# Patient Record
Sex: FEMALE | Race: WHITE | NOT HISPANIC OR LATINO | Employment: OTHER | URBAN - METROPOLITAN AREA
[De-identification: names, ages, dates, MRNs, and addresses within clinical notes are randomized per-mention and may not be internally consistent; named-entity substitution may affect disease eponyms.]

---

## 2017-01-12 ENCOUNTER — HOSPITAL ENCOUNTER (OUTPATIENT)
Dept: INFUSION CENTER | Facility: CLINIC | Age: 72
Discharge: HOME | End: 2017-01-12
Payer: MEDICARE

## 2017-01-12 VITALS
RESPIRATION RATE: 18 BRPM | DIASTOLIC BLOOD PRESSURE: 84 MMHG | TEMPERATURE: 96.7 F | HEART RATE: 71 BPM | SYSTOLIC BLOOD PRESSURE: 132 MMHG

## 2017-01-12 PROCEDURE — 96372 THER/PROPH/DIAG INJ SC/IM: CPT

## 2017-01-12 RX ADMIN — OMALIZUMAB 150 MG: 202.5 INJECTION, SOLUTION SUBCUTANEOUS at 13:52

## 2017-02-09 ENCOUNTER — HOSPITAL ENCOUNTER (OUTPATIENT)
Dept: INFUSION CENTER | Facility: CLINIC | Age: 72
Discharge: HOME | End: 2017-02-09
Payer: MEDICARE

## 2017-02-09 VITALS
DIASTOLIC BLOOD PRESSURE: 84 MMHG | RESPIRATION RATE: 18 BRPM | SYSTOLIC BLOOD PRESSURE: 128 MMHG | TEMPERATURE: 96.4 F | HEART RATE: 72 BPM

## 2017-02-09 PROCEDURE — 96372 THER/PROPH/DIAG INJ SC/IM: CPT

## 2017-02-09 RX ORDER — BIOTIN 10000 MCG
10 CAPSULE ORAL DAILY
COMMUNITY
End: 2018-03-19 | Stop reason: ALTCHOICE

## 2017-02-09 RX ADMIN — OMALIZUMAB 150 MG: 202.5 INJECTION, SOLUTION SUBCUTANEOUS at 09:23

## 2017-02-09 NOTE — PROGRESS NOTES
Pt arrived to Infusion center for Xolair injection, no complaints noted  Pt has Epi pen (exp  11/2017)  Injections given and waited the 30 min observation time  Pt  tolerated well, no reactions noted  AVS given and next appointment made, pt aware

## 2017-03-09 ENCOUNTER — HOSPITAL ENCOUNTER (OUTPATIENT)
Dept: INFUSION CENTER | Facility: CLINIC | Age: 72
Discharge: HOME | End: 2017-03-09
Payer: MEDICARE

## 2017-03-09 VITALS
TEMPERATURE: 96.9 F | HEART RATE: 76 BPM | RESPIRATION RATE: 18 BRPM | SYSTOLIC BLOOD PRESSURE: 121 MMHG | DIASTOLIC BLOOD PRESSURE: 82 MMHG

## 2017-03-09 PROCEDURE — 96372 THER/PROPH/DIAG INJ SC/IM: CPT

## 2017-03-09 RX ADMIN — OMALIZUMAB 150 MG: 202.5 INJECTION, SOLUTION SUBCUTANEOUS at 09:14

## 2017-03-09 NOTE — PROGRESS NOTES
Patient arrived to the infusion center with her epi pen  Xolair given SQ in right and left arms  Pt observed for 30 minutes with no adverse events  Pt is aware of next appointment

## 2017-04-06 ENCOUNTER — HOSPITAL ENCOUNTER (OUTPATIENT)
Dept: INFUSION CENTER | Facility: CLINIC | Age: 72
Discharge: HOME | End: 2017-04-06
Payer: MEDICARE

## 2017-04-06 VITALS
TEMPERATURE: 96.1 F | HEART RATE: 72 BPM | DIASTOLIC BLOOD PRESSURE: 84 MMHG | SYSTOLIC BLOOD PRESSURE: 140 MMHG | RESPIRATION RATE: 18 BRPM

## 2017-04-06 PROCEDURE — 96372 THER/PROPH/DIAG INJ SC/IM: CPT

## 2017-04-06 RX ADMIN — OMALIZUMAB 150 MG: 202.5 INJECTION, SOLUTION SUBCUTANEOUS at 10:04

## 2017-04-06 NOTE — PROGRESS NOTES
Tolerated xolair injections well, no s/s of rxn noted  Will return for next appt as scheduled  Epi-pen on her person

## 2017-05-03 ENCOUNTER — HOSPITAL ENCOUNTER (OUTPATIENT)
Dept: INFUSION CENTER | Facility: CLINIC | Age: 72
Discharge: HOME | End: 2017-05-03
Payer: MEDICARE

## 2017-05-03 VITALS
TEMPERATURE: 97 F | RESPIRATION RATE: 16 BRPM | SYSTOLIC BLOOD PRESSURE: 132 MMHG | DIASTOLIC BLOOD PRESSURE: 88 MMHG | HEART RATE: 64 BPM

## 2017-05-03 PROCEDURE — 96372 THER/PROPH/DIAG INJ SC/IM: CPT

## 2017-05-03 RX ADMIN — OMALIZUMAB 150 MG: 202.5 INJECTION, SOLUTION SUBCUTANEOUS at 11:54

## 2017-05-03 NOTE — PLAN OF CARE
Problem: PAIN - ADULT  Goal: Verbalizes/displays adequate comfort level or baseline comfort level  Interventions:  - Encourage patient to monitor pain and request assistance  - Assess pain using appropriate pain scale  - Administer analgesics based on type and severity of pain and evaluate response  - Implement non-pharmacological measures as appropriate and evaluate response  - Consider cultural and social influences on pain and pain management  - Notify physician/advanced practitioner if interventions unsuccessful or patient reports new pain  Outcome: Progressing    Problem: INFECTION - ADULT  Goal: Absence or prevention of progression during hospitalization  INTERVENTIONS:  - Assess and monitor for signs and symptoms of infection  - Monitor lab/diagnostic results  - Monitor all insertion sites, i e  indwelling lines, tubes, and drains  - Monitor endotracheal (as able) and nasal secretions for changes in amount and color  - [place] appropriate cooling/warming therapies per order  - Administer medications as ordered  - Instruct and encourage patient and family to use good hand hygiene technique  - Identify and instruct in appropriate isolation precautions for identified infection/condition  Outcome: Progressing  Goal: Absence of fever/infection during neutropenic period  INTERVENTIONS:  - Monitor WBC  - Implement neutropenic guidelines  Outcome: Progressing    Problem: Knowledge Deficit  Goal: Patient/family/caregiver demonstrates understanding of disease process, treatment plan, medications, and discharge instructions  Complete learning assessment and assess knowledge base    Interventions:  - Provide teaching at level of understanding  - Provide teaching via preferred learning methods  Outcome: Progressing

## 2017-05-03 NOTE — PROGRESS NOTES
Pt  Denies new symptoms or concerns at this time  Xolair ordered today  Epi Pen present; expiration date-11/2017

## 2017-05-03 NOTE — PROGRESS NOTES
Xolair given SQ in Bilateral upper arms, 150mg in each arm to equal ordered dose of 300mg   Will monitor x 30 min

## 2017-05-31 ENCOUNTER — HOSPITAL ENCOUNTER (OUTPATIENT)
Dept: INFUSION CENTER | Facility: CLINIC | Age: 72
Discharge: HOME | End: 2017-05-31
Payer: MEDICARE

## 2017-06-09 ENCOUNTER — HOSPITAL ENCOUNTER (OUTPATIENT)
Dept: INFUSION CENTER | Facility: CLINIC | Age: 72
Discharge: HOME | End: 2017-06-09
Payer: MEDICARE

## 2017-06-09 VITALS — TEMPERATURE: 96.8 F

## 2017-06-09 PROCEDURE — 96372 THER/PROPH/DIAG INJ SC/IM: CPT

## 2017-06-09 RX ORDER — OMEPRAZOLE 40 MG/1
40 CAPSULE, DELAYED RELEASE ORAL DAILY
COMMUNITY
End: 2021-08-16

## 2017-06-09 RX ORDER — RANITIDINE HCL 75 MG
300 TABLET ORAL
COMMUNITY
End: 2019-05-20

## 2017-06-09 RX ADMIN — OMALIZUMAB 150 MG: 202.5 INJECTION, SOLUTION SUBCUTANEOUS at 09:24

## 2017-06-09 NOTE — PROGRESS NOTES
Pt  Tolerated Xolair injections, one in each arm, SQ 150mg each  Pt  Was observed for 30 min and reports no symptoms and left ambulating

## 2017-07-21 ENCOUNTER — HOSPITAL ENCOUNTER (OUTPATIENT)
Dept: INFUSION CENTER | Facility: CLINIC | Age: 72
Discharge: HOME | End: 2017-07-21
Payer: MEDICARE

## 2017-07-21 PROCEDURE — 96372 THER/PROPH/DIAG INJ SC/IM: CPT

## 2017-07-21 RX ADMIN — OMALIZUMAB 150 MG: 202.5 INJECTION, SOLUTION SUBCUTANEOUS at 12:22

## 2017-07-21 RX ADMIN — OMALIZUMAB 150 MG: 202.5 INJECTION, SOLUTION SUBCUTANEOUS at 12:21

## 2017-07-21 NOTE — PROGRESS NOTES
Patient arrived to the infusion center with her epi pen  Xolair given as ordered  Pt observed for 30 minutes post injections with no adverse events  Pt is aware of next appointment  Pt left the infusion center in stable condition

## 2017-08-18 ENCOUNTER — HOSPITAL ENCOUNTER (OUTPATIENT)
Dept: INFUSION CENTER | Facility: CLINIC | Age: 72
Discharge: HOME | End: 2017-08-18
Payer: MEDICARE

## 2017-08-18 VITALS
HEART RATE: 64 BPM | TEMPERATURE: 96.1 F | RESPIRATION RATE: 18 BRPM | SYSTOLIC BLOOD PRESSURE: 144 MMHG | DIASTOLIC BLOOD PRESSURE: 83 MMHG

## 2017-08-18 PROCEDURE — 96372 THER/PROPH/DIAG INJ SC/IM: CPT

## 2017-08-18 RX ADMIN — OMALIZUMAB 150 MG: 202.5 INJECTION, SOLUTION SUBCUTANEOUS at 11:27

## 2017-08-18 RX ADMIN — OMALIZUMAB 150 MG: 202.5 INJECTION, SOLUTION SUBCUTANEOUS at 11:26

## 2017-08-18 NOTE — PLAN OF CARE

## 2017-09-25 ENCOUNTER — HOSPITAL ENCOUNTER (OUTPATIENT)
Dept: INFUSION CENTER | Facility: CLINIC | Age: 72
Discharge: HOME | End: 2017-09-25
Payer: MEDICARE

## 2017-10-05 ENCOUNTER — HOSPITAL ENCOUNTER (OUTPATIENT)
Dept: INFUSION CENTER | Facility: CLINIC | Age: 72
Discharge: HOME | End: 2017-10-05
Payer: MEDICARE

## 2017-10-05 VITALS
DIASTOLIC BLOOD PRESSURE: 79 MMHG | RESPIRATION RATE: 18 BRPM | SYSTOLIC BLOOD PRESSURE: 143 MMHG | HEART RATE: 70 BPM | TEMPERATURE: 98.9 F

## 2017-10-05 PROCEDURE — 96372 THER/PROPH/DIAG INJ SC/IM: CPT

## 2017-10-05 RX ADMIN — OMALIZUMAB 150 MG: 202.5 INJECTION, SOLUTION SUBCUTANEOUS at 09:23

## 2017-10-05 RX ADMIN — OMALIZUMAB 150 MG: 202.5 INJECTION, SOLUTION SUBCUTANEOUS at 09:24

## 2017-10-05 NOTE — PROGRESS NOTES
Pt observed for 30 minutes post xolair  Pt left infusion center in stable condition  Pt declined AVS but is aware of future appts

## 2017-11-06 ENCOUNTER — HOSPITAL ENCOUNTER (OUTPATIENT)
Dept: INFUSION CENTER | Facility: CLINIC | Age: 72
Discharge: HOME | End: 2017-11-06
Payer: MEDICARE

## 2017-11-06 VITALS
RESPIRATION RATE: 18 BRPM | DIASTOLIC BLOOD PRESSURE: 78 MMHG | TEMPERATURE: 96.4 F | SYSTOLIC BLOOD PRESSURE: 137 MMHG | HEART RATE: 64 BPM

## 2017-11-06 PROCEDURE — 96372 THER/PROPH/DIAG INJ SC/IM: CPT

## 2017-11-06 RX ORDER — MULTIVITAMIN WITH IRON
1 TABLET ORAL DAILY
COMMUNITY
End: 2021-05-13

## 2017-11-06 RX ADMIN — OMALIZUMAB 150 MG: 202.5 INJECTION, SOLUTION SUBCUTANEOUS at 08:34

## 2017-11-06 NOTE — PROGRESS NOTES
Patient arrived to the infusion center with her epi pen  Denies any discomfort  Xolair given as ordered  Pt observed for 30 minutes with no adverse events  Pt is aware of next appointment   Refused AVS

## 2017-12-04 ENCOUNTER — HOSPITAL ENCOUNTER (OUTPATIENT)
Dept: INFUSION CENTER | Facility: CLINIC | Age: 72
Discharge: HOME | End: 2017-12-04
Payer: MEDICARE

## 2017-12-04 VITALS
HEART RATE: 71 BPM | TEMPERATURE: 96 F | RESPIRATION RATE: 18 BRPM | SYSTOLIC BLOOD PRESSURE: 122 MMHG | DIASTOLIC BLOOD PRESSURE: 79 MMHG

## 2017-12-04 PROCEDURE — 96372 THER/PROPH/DIAG INJ SC/IM: CPT

## 2017-12-04 RX ADMIN — OMALIZUMAB 150 MG: 202.5 INJECTION, SOLUTION SUBCUTANEOUS at 08:58

## 2017-12-04 NOTE — PROGRESS NOTES
Pt arrived to infusion center for xolair injection  Epipen expires 12/2018  Pt offers no complaints  [place] for treatment today

## 2017-12-04 NOTE — PROGRESS NOTES
Pt received xolair injection in both arms without complications  Observed pt for 30 minutes post-administration without incident  Pt discharged in stable condition  Next appointment scheduled in 4 weeks  AVS provided

## 2018-01-02 ENCOUNTER — HOSPITAL ENCOUNTER (OUTPATIENT)
Dept: INFUSION CENTER | Facility: CLINIC | Age: 73
Discharge: HOME | End: 2018-01-04
Payer: MEDICARE

## 2018-01-31 ENCOUNTER — HOSPITAL ENCOUNTER (OUTPATIENT)
Dept: INFUSION CENTER | Facility: CLINIC | Age: 73
Discharge: HOME | End: 2018-01-31
Payer: MEDICARE

## 2018-01-31 VITALS
RESPIRATION RATE: 16 BRPM | DIASTOLIC BLOOD PRESSURE: 81 MMHG | HEART RATE: 79 BPM | TEMPERATURE: 97.6 F | SYSTOLIC BLOOD PRESSURE: 136 MMHG

## 2018-01-31 PROCEDURE — 96372 THER/PROPH/DIAG INJ SC/IM: CPT

## 2018-01-31 RX ORDER — ALBUTEROL SULFATE 90 UG/1
2 AEROSOL, METERED RESPIRATORY (INHALATION) EVERY 6 HOURS PRN
COMMUNITY
End: 2019-05-20 | Stop reason: SDUPTHER

## 2018-01-31 RX ADMIN — OMALIZUMAB 150 MG: 202.5 INJECTION, SOLUTION SUBCUTANEOUS at 08:24

## 2018-01-31 NOTE — PROGRESS NOTES
Patient arrived on unit  Stated she had been sick and on antibiotics and steroids due to URI, now completed  Feeling much better and cleared for Xolair today  Has epipen along with her and within  expiration date  Tolerated Xolair injections  Remained 30 minute for observation  No discomforts  Aware of next appointment   AVS given to patient

## 2018-03-19 ENCOUNTER — HOSPITAL ENCOUNTER (OUTPATIENT)
Dept: INFUSION CENTER | Facility: CLINIC | Age: 73
Discharge: HOME | End: 2018-03-19
Payer: MEDICARE

## 2018-03-19 VITALS
DIASTOLIC BLOOD PRESSURE: 70 MMHG | RESPIRATION RATE: 16 BRPM | TEMPERATURE: 96.5 F | SYSTOLIC BLOOD PRESSURE: 122 MMHG | HEART RATE: 76 BPM

## 2018-03-19 PROCEDURE — 96372 THER/PROPH/DIAG INJ SC/IM: CPT

## 2018-03-19 RX ADMIN — OMALIZUMAB 150 MG: 202.5 INJECTION, SOLUTION SUBCUTANEOUS at 09:19

## 2018-03-19 NOTE — PLAN OF CARE
Problem: Potential for Falls  Goal: Patient will remain free of falls  INTERVENTIONS:  - Assess patient frequently for physical needs  -  Identify cognitive and physical deficits and behaviors that affect risk of falls    -  [place] fall precautions as indicated by assessment   - Educate patient/family on patient safety including physical limitations  - Instruct patient to call for assistance with activity based on assessment  - Modify environment to reduce risk of injury  - Consider OT/PT consult to assist with strengthening/mobility   Outcome: Progressing

## 2018-03-19 NOTE — PROGRESS NOTES
Patient arrived to unit today without any complaints  Patient epipen expires Dec 2018  Patient tolerated xolair in right and left upper arm SQ without complicaitons  Patient waited for half hour per order and made next appt in 4 weeks  AVS declined

## 2018-04-16 ENCOUNTER — HOSPITAL ENCOUNTER (OUTPATIENT)
Dept: INFUSION CENTER | Facility: CLINIC | Age: 73
Discharge: HOME | End: 2018-04-16
Payer: MEDICARE

## 2018-04-16 VITALS
DIASTOLIC BLOOD PRESSURE: 89 MMHG | SYSTOLIC BLOOD PRESSURE: 133 MMHG | HEART RATE: 71 BPM | TEMPERATURE: 97.2 F | RESPIRATION RATE: 16 BRPM

## 2018-04-16 PROCEDURE — 96372 THER/PROPH/DIAG INJ SC/IM: CPT

## 2018-04-16 RX ADMIN — OMALIZUMAB 150 MG: 202.5 INJECTION, SOLUTION SUBCUTANEOUS at 09:41

## 2018-04-16 RX ADMIN — OMALIZUMAB 150 MG: 202.5 INJECTION, SOLUTION SUBCUTANEOUS at 09:42

## 2018-04-16 NOTE — PLAN OF CARE
Problem: Potential for Falls  Goal: Patient will remain free of falls  INTERVENTIONS:  - Assess patient frequently for physical needs  -  Identify cognitive and physical deficits and behaviors that affect risk of falls    -  [place] fall precautions as indicated by assessment   - Educate patient/family on patient safety including physical limitations  - Instruct patient to call for assistance with activity based on assessment  - Modify environment to reduce risk of injury  - Consider OT/PT consult to assist with strengthening/mobility   Outcome: Progressing

## 2018-04-16 NOTE — PROGRESS NOTES
Patient arrived to the infusion center with her epi pen  Pt offers no complaints  Xolair given as ordered  Pt observed for 30 minutes post injections with no complications  Pt is aware of next appointment   Refused AVS

## 2018-05-21 ENCOUNTER — HOSPITAL ENCOUNTER (OUTPATIENT)
Dept: INFUSION CENTER | Facility: CLINIC | Age: 73
Discharge: HOME | End: 2018-05-21
Payer: MEDICARE

## 2018-05-21 VITALS
RESPIRATION RATE: 18 BRPM | TEMPERATURE: 98.1 F | HEART RATE: 79 BPM | SYSTOLIC BLOOD PRESSURE: 116 MMHG | DIASTOLIC BLOOD PRESSURE: 72 MMHG

## 2018-05-21 PROCEDURE — 96372 THER/PROPH/DIAG INJ SC/IM: CPT

## 2018-05-21 RX ADMIN — OMALIZUMAB 150 MG: 202.5 INJECTION, SOLUTION SUBCUTANEOUS at 13:01

## 2018-05-21 NOTE — PROGRESS NOTES
Patient arrived to the infusion center with her epi pen  Pt offers no complaints  Xolair given as ordered  Pt observed for 30 minutes post injections with no adverse events  Pt is aware of next appointment   Refused AVS

## 2018-06-18 ENCOUNTER — HOSPITAL ENCOUNTER (OUTPATIENT)
Dept: INFUSION CENTER | Facility: CLINIC | Age: 73
Discharge: HOME | End: 2018-06-18
Payer: MEDICARE

## 2018-06-18 VITALS
DIASTOLIC BLOOD PRESSURE: 82 MMHG | SYSTOLIC BLOOD PRESSURE: 131 MMHG | RESPIRATION RATE: 18 BRPM | TEMPERATURE: 97.2 F | HEART RATE: 72 BPM

## 2018-06-18 PROCEDURE — 96372 THER/PROPH/DIAG INJ SC/IM: CPT

## 2018-06-18 RX ADMIN — OMALIZUMAB 150 MG: 202.5 INJECTION, SOLUTION SUBCUTANEOUS at 10:09

## 2018-06-18 NOTE — PROGRESS NOTES
S/P 30 minutes observation for xolair injections  No s/s of reaction noted  Will return in 4 weeks for next xolair injection

## 2018-07-16 ENCOUNTER — HOSPITAL ENCOUNTER (OUTPATIENT)
Dept: INFUSION CENTER | Facility: CLINIC | Age: 73
Discharge: HOME | End: 2018-07-16
Payer: MEDICARE

## 2018-07-16 VITALS
RESPIRATION RATE: 18 BRPM | SYSTOLIC BLOOD PRESSURE: 130 MMHG | TEMPERATURE: 96.7 F | HEART RATE: 76 BPM | DIASTOLIC BLOOD PRESSURE: 76 MMHG

## 2018-07-16 PROCEDURE — 96372 THER/PROPH/DIAG INJ SC/IM: CPT

## 2018-07-16 RX ADMIN — OMALIZUMAB 150 MG: 202.5 INJECTION, SOLUTION SUBCUTANEOUS at 09:59

## 2018-08-17 ENCOUNTER — HOSPITAL ENCOUNTER (OUTPATIENT)
Dept: INFUSION CENTER | Facility: CLINIC | Age: 73
Discharge: HOME | End: 2018-08-17
Payer: MEDICARE

## 2018-08-17 VITALS
TEMPERATURE: 97.1 F | SYSTOLIC BLOOD PRESSURE: 130 MMHG | RESPIRATION RATE: 16 BRPM | DIASTOLIC BLOOD PRESSURE: 76 MMHG | HEART RATE: 82 BPM

## 2018-08-17 PROCEDURE — 96372 THER/PROPH/DIAG INJ SC/IM: CPT

## 2018-08-17 RX ADMIN — OMALIZUMAB 150 MG: 202.5 INJECTION, SOLUTION SUBCUTANEOUS at 13:19

## 2018-08-17 RX ADMIN — OMALIZUMAB 150 MG: 202.5 INJECTION, SOLUTION SUBCUTANEOUS at 13:20

## 2018-08-17 NOTE — PROGRESS NOTES
Patient arrived to the infusion center with her epi pen  Pt offers no complaints  Xolair given as ordered  Pt observed for 30 minutes post injections with no adverse events  Pt will call to schedule next appointment   Refused AVS

## 2018-09-17 ENCOUNTER — HOSPITAL ENCOUNTER (OUTPATIENT)
Dept: INFUSION CENTER | Facility: CLINIC | Age: 73
Discharge: HOME | End: 2018-09-17
Payer: MEDICARE

## 2018-09-17 VITALS
SYSTOLIC BLOOD PRESSURE: 119 MMHG | DIASTOLIC BLOOD PRESSURE: 83 MMHG | HEART RATE: 67 BPM | TEMPERATURE: 98.9 F | RESPIRATION RATE: 18 BRPM

## 2018-09-17 PROCEDURE — 96372 THER/PROPH/DIAG INJ SC/IM: CPT

## 2018-09-17 RX ADMIN — OMALIZUMAB 150 MG: 202.5 INJECTION, SOLUTION SUBCUTANEOUS at 10:31

## 2018-09-17 NOTE — PLAN OF CARE
Problem: INFECTION - ADULT  Goal: Absence or prevention of progression during hospitalization  INTERVENTIONS:  - Assess and monitor for signs and symptoms of infection  - Monitor lab/diagnostic results  - Monitor all insertion sites, i e  indwelling lines, tubes, and drains  - Monitor endotracheal (as able) and nasal secretions for changes in amount and color  - [place] appropriate cooling/warming therapies per order  - Administer medications as ordered  - Instruct and encourage patient and family to use good hand hygiene technique  - Identify and instruct in appropriate isolation precautions for identified infection/condition  Outcome: Progressing    Goal: Absence of fever/infection during neutropenic period  INTERVENTIONS:  - Monitor WBC  - Implement neutropenic guidelines  Outcome: Progressing

## 2018-09-17 NOTE — PROGRESS NOTES
Patient arrived to the unit with her epi pen that expires in Dec of 2018  She denies infection  She tolerated her injections without adverse reaction and remained at the infusion center for 30 minutes of observation

## 2018-10-15 ENCOUNTER — HOSPITAL ENCOUNTER (OUTPATIENT)
Dept: INFUSION CENTER | Facility: CLINIC | Age: 73
Discharge: HOME | End: 2018-10-15
Payer: MEDICARE

## 2018-10-15 VITALS
HEART RATE: 69 BPM | SYSTOLIC BLOOD PRESSURE: 127 MMHG | TEMPERATURE: 97.4 F | RESPIRATION RATE: 18 BRPM | DIASTOLIC BLOOD PRESSURE: 83 MMHG

## 2018-10-15 PROCEDURE — 96372 THER/PROPH/DIAG INJ SC/IM: CPT

## 2018-10-15 RX ADMIN — OMALIZUMAB 150 MG: 202.5 INJECTION, SOLUTION SUBCUTANEOUS at 09:30

## 2018-10-15 RX ADMIN — OMALIZUMAB 150 MG: 202.5 INJECTION, SOLUTION SUBCUTANEOUS at 09:31

## 2018-10-15 NOTE — PLAN OF CARE

## 2018-10-15 NOTE — PROGRESS NOTES
Patient arrived to the infusion center with her epi pen exp  12/2018  Pt offers no complaints  Xolair given as ordered  Pt observed for 30 minutes with no adverse events  Pt is aware of next appointment   Refused AVS

## 2018-12-12 ENCOUNTER — HOSPITAL ENCOUNTER (OUTPATIENT)
Dept: INFUSION CENTER | Facility: CLINIC | Age: 73
Discharge: HOME | End: 2018-12-12
Payer: MEDICARE

## 2018-12-12 VITALS
DIASTOLIC BLOOD PRESSURE: 85 MMHG | SYSTOLIC BLOOD PRESSURE: 124 MMHG | TEMPERATURE: 96.1 F | RESPIRATION RATE: 18 BRPM | HEART RATE: 75 BPM

## 2018-12-12 PROCEDURE — 96372 THER/PROPH/DIAG INJ SC/IM: CPT

## 2018-12-12 RX ADMIN — OMALIZUMAB 150 MG: 202.5 INJECTION, SOLUTION SUBCUTANEOUS at 11:16

## 2019-01-10 ENCOUNTER — HOSPITAL ENCOUNTER (OUTPATIENT)
Dept: INFUSION CENTER | Facility: CLINIC | Age: 74
Discharge: HOME | End: 2019-01-10
Payer: MEDICARE

## 2019-01-10 VITALS
SYSTOLIC BLOOD PRESSURE: 139 MMHG | OXYGEN SATURATION: 97 % | RESPIRATION RATE: 18 BRPM | HEART RATE: 84 BPM | TEMPERATURE: 98.4 F | DIASTOLIC BLOOD PRESSURE: 77 MMHG

## 2019-01-10 PROCEDURE — 96372 THER/PROPH/DIAG INJ SC/IM: CPT

## 2019-01-10 RX ADMIN — OMALIZUMAB 150 MG: 202.5 INJECTION, SOLUTION SUBCUTANEOUS at 13:28

## 2019-01-10 NOTE — PROGRESS NOTES
Patient ambulated to chair without difficulty  Patient offers no complaints  Denies any S/S of infection  Patient had Epi-pen at bedside  Exp date 09/04/2019  Will continue to monitor

## 2019-02-11 ENCOUNTER — HOSPITAL ENCOUNTER (OUTPATIENT)
Dept: INFUSION CENTER | Facility: CLINIC | Age: 74
Discharge: HOME | End: 2019-02-11

## 2019-02-27 ENCOUNTER — HOSPITAL ENCOUNTER (OUTPATIENT)
Dept: INFUSION CENTER | Facility: CLINIC | Age: 74
Discharge: HOME | End: 2019-02-27
Payer: MEDICARE

## 2019-02-27 VITALS
RESPIRATION RATE: 18 BRPM | HEART RATE: 69 BPM | TEMPERATURE: 97.2 F | DIASTOLIC BLOOD PRESSURE: 72 MMHG | SYSTOLIC BLOOD PRESSURE: 119 MMHG

## 2019-02-27 PROCEDURE — 96372 THER/PROPH/DIAG INJ SC/IM: CPT

## 2019-02-27 RX ADMIN — OMALIZUMAB 150 MG: 150 INJECTION, SOLUTION SUBCUTANEOUS at 13:56

## 2019-02-27 RX ADMIN — OMALIZUMAB 150 MG: 150 INJECTION, SOLUTION SUBCUTANEOUS at 13:55

## 2019-02-27 NOTE — PROGRESS NOTES
Patient arrived to the infusion center with her Epi Pen  Pt offered no complaints  Xolair given as ordered  Pt observed for 30 post injections with no adverse events  Pt is aware of next appointment   Refused AVS

## 2019-04-08 ENCOUNTER — HOSPITAL ENCOUNTER (OUTPATIENT)
Dept: INFUSION CENTER | Facility: CLINIC | Age: 74
Discharge: HOME | End: 2019-04-08
Payer: MEDICARE

## 2019-04-08 PROCEDURE — 96372 THER/PROPH/DIAG INJ SC/IM: CPT

## 2019-04-08 RX ADMIN — OMALIZUMAB 150 MG: 202.5 INJECTION, SOLUTION SUBCUTANEOUS at 14:06

## 2019-04-08 RX ADMIN — OMALIZUMAB 150 MG: 202.5 INJECTION, SOLUTION SUBCUTANEOUS at 14:05

## 2019-04-08 NOTE — PROGRESS NOTES
Patient arrived to the infusion center with her epi pen  Offers no complaints  Xolair given as ordered  Pt observed for 30 minutes post injections with no adverse events  Pt is aware of next appointment   Refused AVS

## 2019-05-20 ENCOUNTER — HOSPITAL ENCOUNTER (OUTPATIENT)
Dept: INFUSION CENTER | Facility: CLINIC | Age: 74
Discharge: HOME | End: 2019-05-20
Payer: MEDICARE

## 2019-05-20 VITALS
DIASTOLIC BLOOD PRESSURE: 70 MMHG | HEART RATE: 78 BPM | RESPIRATION RATE: 18 BRPM | TEMPERATURE: 96.4 F | SYSTOLIC BLOOD PRESSURE: 109 MMHG

## 2019-05-20 PROCEDURE — 96372 THER/PROPH/DIAG INJ SC/IM: CPT

## 2019-05-20 RX ADMIN — OMALIZUMAB 300 MG: 150 INJECTION, SOLUTION SUBCUTANEOUS at 13:32

## 2019-05-20 NOTE — PROGRESS NOTES
Patient arrived to the infusion center with her epi pen  Offers no complaints  Xolair given as ordered  Pt observed for 30 minutes post injections with no adverse events  Pt is aware of next appointment, declines AVS today

## 2019-06-18 ENCOUNTER — HOSPITAL ENCOUNTER (OUTPATIENT)
Dept: INFUSION CENTER | Facility: CLINIC | Age: 74
Discharge: HOME | End: 2019-06-18
Payer: MEDICARE

## 2019-06-18 VITALS
HEART RATE: 73 BPM | TEMPERATURE: 99 F | RESPIRATION RATE: 18 BRPM | SYSTOLIC BLOOD PRESSURE: 121 MMHG | DIASTOLIC BLOOD PRESSURE: 80 MMHG

## 2019-06-18 PROCEDURE — 96372 THER/PROPH/DIAG INJ SC/IM: CPT

## 2019-06-18 RX ORDER — ROSUVASTATIN CALCIUM 10 MG/1
10 TABLET, COATED ORAL DAILY
COMMUNITY
End: 2022-03-08 | Stop reason: ALTCHOICE

## 2019-06-18 RX ORDER — ASPIRIN 81 MG/1
81 TABLET ORAL DAILY
COMMUNITY
End: 2021-11-09 | Stop reason: ALTCHOICE

## 2019-06-18 RX ADMIN — MEPOLIZUMAB 100 MG: 100 INJECTION, POWDER, FOR SOLUTION SUBCUTANEOUS at 13:27

## 2019-06-18 NOTE — PLAN OF CARE
Problem: SAFETY ADULT  Goal: Patient will remain free of falls  Description  INTERVENTIONS:  - Assess patient frequently for physical needs  -  Identify cognitive and physical deficits and behaviors that affect risk of falls    -  [place] fall precautions as indicated by assessment   - Educate patient/family on patient safety including physical limitations  - Instruct patient to call for assistance with activity based on assessment  - Modify environment to reduce risk of injury  - Consider OT/PT consult to assist with strengthening/mobility  Outcome: Progressing

## 2019-06-18 NOTE — PROGRESS NOTES
Patient arrived to the unit and denied infection  She tolerated her nucala and remained at the infusion center for 30 minutes of observation without adverse reaction

## 2019-07-15 ENCOUNTER — HOSPITAL ENCOUNTER (OUTPATIENT)
Dept: INFUSION CENTER | Facility: CLINIC | Age: 74
Discharge: HOME | End: 2019-07-15
Payer: MEDICARE

## 2019-07-15 VITALS
HEART RATE: 67 BPM | RESPIRATION RATE: 18 BRPM | TEMPERATURE: 98.3 F | DIASTOLIC BLOOD PRESSURE: 82 MMHG | SYSTOLIC BLOOD PRESSURE: 132 MMHG

## 2019-07-15 PROCEDURE — 96372 THER/PROPH/DIAG INJ SC/IM: CPT

## 2019-07-15 RX ADMIN — MEPOLIZUMAB 100 MG: 100 INJECTION, POWDER, FOR SOLUTION SUBCUTANEOUS at 13:45

## 2019-07-15 NOTE — PROGRESS NOTES
Pt  Denies new symptoms or concerns  Pt  Denies recent infection  Nucala given SQ in NHAN as ordered  Will monitor x 30 minutes  Future appointment scheduled x 4 weeks  Pt  Understands next injection is scheduled 8/12/2019

## 2019-07-15 NOTE — PROGRESS NOTES
Pt  Discharged without adverse event  Pt  Verbalizes carrying Epi Pen  Future appointment completed   Declined AVS

## 2019-08-12 ENCOUNTER — HOSPITAL ENCOUNTER (OUTPATIENT)
Dept: INFUSION CENTER | Facility: CLINIC | Age: 74
Discharge: HOME | End: 2019-08-12

## 2019-08-19 ENCOUNTER — HOSPITAL ENCOUNTER (OUTPATIENT)
Dept: INFUSION CENTER | Facility: CLINIC | Age: 74
Discharge: HOME | End: 2019-08-19
Payer: MEDICARE

## 2019-08-19 VITALS
HEART RATE: 74 BPM | SYSTOLIC BLOOD PRESSURE: 118 MMHG | RESPIRATION RATE: 18 BRPM | DIASTOLIC BLOOD PRESSURE: 79 MMHG | TEMPERATURE: 98.2 F

## 2019-08-19 PROCEDURE — 96372 THER/PROPH/DIAG INJ SC/IM: CPT

## 2019-08-19 RX ADMIN — MEPOLIZUMAB 100 MG: 100 INJECTION, POWDER, FOR SOLUTION SUBCUTANEOUS at 13:13

## 2019-09-16 ENCOUNTER — HOSPITAL ENCOUNTER (OUTPATIENT)
Dept: INFUSION CENTER | Facility: CLINIC | Age: 74
Discharge: HOME | End: 2019-09-16
Payer: MEDICARE

## 2019-09-16 VITALS
TEMPERATURE: 97 F | RESPIRATION RATE: 18 BRPM | HEART RATE: 81 BPM | SYSTOLIC BLOOD PRESSURE: 137 MMHG | DIASTOLIC BLOOD PRESSURE: 83 MMHG

## 2019-09-16 PROCEDURE — 96372 THER/PROPH/DIAG INJ SC/IM: CPT

## 2019-09-16 RX ADMIN — MEPOLIZUMAB 100 MG: 100 INJECTION, POWDER, FOR SOLUTION SUBCUTANEOUS at 13:24

## 2019-09-16 NOTE — PROGRESS NOTES
Patient arrived to unit for Nucala without complaints- denies any recent fever/illness  Patient tolerated injection and observation period without complication  Next appointment made, AVS provided, left unit in stable condition

## 2019-10-16 ENCOUNTER — HOSPITAL ENCOUNTER (OUTPATIENT)
Dept: INFUSION CENTER | Facility: CLINIC | Age: 74
Discharge: HOME | End: 2019-10-16
Payer: MEDICARE

## 2019-10-16 VITALS
SYSTOLIC BLOOD PRESSURE: 126 MMHG | DIASTOLIC BLOOD PRESSURE: 78 MMHG | RESPIRATION RATE: 20 BRPM | HEART RATE: 60 BPM | TEMPERATURE: 97.1 F

## 2019-10-16 PROCEDURE — 96372 THER/PROPH/DIAG INJ SC/IM: CPT

## 2019-10-16 RX ADMIN — MEPOLIZUMAB 100 MG: 100 INJECTION, POWDER, FOR SOLUTION SUBCUTANEOUS at 14:03

## 2019-10-16 NOTE — PLAN OF CARE
Problem: Potential for Falls  Goal: Patient will remain free of falls  Description  INTERVENTIONS:  - Assess patient frequently for physical needs  -  Identify cognitive and physical deficits and behaviors that affect risk of falls    -  [place] fall precautions as indicated by assessment   - Educate patient/family on patient safety including physical limitations  - Instruct patient to call for assistance with activity based on assessment  - Modify environment to reduce risk of injury  - Consider OT/PT consult to assist with strengthening/mobility  Outcome: Progressing

## 2019-11-13 ENCOUNTER — HOSPITAL ENCOUNTER (OUTPATIENT)
Dept: INFUSION CENTER | Facility: CLINIC | Age: 74
Discharge: HOME | End: 2019-11-13
Payer: MEDICARE

## 2019-11-13 VITALS
TEMPERATURE: 97.1 F | SYSTOLIC BLOOD PRESSURE: 105 MMHG | RESPIRATION RATE: 16 BRPM | HEART RATE: 77 BPM | DIASTOLIC BLOOD PRESSURE: 69 MMHG

## 2019-11-13 PROCEDURE — 96372 THER/PROPH/DIAG INJ SC/IM: CPT

## 2019-11-13 RX ADMIN — MEPOLIZUMAB 100 MG: 100 INJECTION, POWDER, FOR SOLUTION SUBCUTANEOUS at 12:47

## 2019-11-13 NOTE — PROGRESS NOTES
Patient arrived on unit for Nucala  Denies any discomforts  Tolerated injection in [place]  Remained 30 minutes for observation  Aware of next appointment   Declined AVS

## 2019-12-11 ENCOUNTER — HOSPITAL ENCOUNTER (OUTPATIENT)
Dept: INFUSION CENTER | Facility: CLINIC | Age: 74
Discharge: HOME | End: 2019-12-11
Payer: MEDICARE

## 2019-12-11 VITALS
HEART RATE: 84 BPM | RESPIRATION RATE: 16 BRPM | DIASTOLIC BLOOD PRESSURE: 74 MMHG | SYSTOLIC BLOOD PRESSURE: 116 MMHG | TEMPERATURE: 97.1 F

## 2019-12-11 PROCEDURE — 96372 THER/PROPH/DIAG INJ SC/IM: CPT

## 2019-12-11 RX ADMIN — MEPOLIZUMAB 100 MG: 100 INJECTION, POWDER, FOR SOLUTION SUBCUTANEOUS at 13:08

## 2020-01-08 ENCOUNTER — HOSPITAL ENCOUNTER (OUTPATIENT)
Dept: INFUSION CENTER | Facility: CLINIC | Age: 75
Discharge: HOME | End: 2020-01-08
Payer: MEDICARE

## 2020-01-08 VITALS
TEMPERATURE: 97.6 F | RESPIRATION RATE: 18 BRPM | DIASTOLIC BLOOD PRESSURE: 79 MMHG | SYSTOLIC BLOOD PRESSURE: 112 MMHG | HEART RATE: 75 BPM

## 2020-01-08 PROCEDURE — 96372 THER/PROPH/DIAG INJ SC/IM: CPT

## 2020-01-08 RX ADMIN — MEPOLIZUMAB 100 MG: 100 INJECTION, POWDER, FOR SOLUTION SUBCUTANEOUS at 13:03

## 2020-01-08 NOTE — PROGRESS NOTES
Pt given Nucala injection as ordered  Pt to be observed for 30 minutes post injection  Pt made future appt before leaving infusion center    Pt declined AVS

## 2020-02-05 ENCOUNTER — HOSPITAL ENCOUNTER (OUTPATIENT)
Dept: INFUSION CENTER | Facility: CLINIC | Age: 75
Discharge: HOME | End: 2020-02-05
Payer: MEDICARE

## 2020-02-05 VITALS
DIASTOLIC BLOOD PRESSURE: 65 MMHG | TEMPERATURE: 97.3 F | SYSTOLIC BLOOD PRESSURE: 118 MMHG | RESPIRATION RATE: 18 BRPM | HEART RATE: 71 BPM

## 2020-02-05 PROCEDURE — 96372 THER/PROPH/DIAG INJ SC/IM: CPT

## 2020-02-05 RX ADMIN — MEPOLIZUMAB 100 MG: 100 INJECTION, POWDER, FOR SOLUTION SUBCUTANEOUS at 13:26

## 2020-02-05 NOTE — PROGRESS NOTES
Pt given Nucala as ordered  Pt to be observed for 30 minutes post injection  Pt declined AVS but did make future appt before leaving infusion center

## 2020-02-17 PROBLEM — J30.81 ALLERGIC RHINITIS DUE TO ANIMAL (CAT) (DOG) HAIR AND DANDER: Status: ACTIVE | Noted: 2020-02-17

## 2020-02-17 PROBLEM — R42 VERTIGO, LATE EFFECT OF CEREBROVASCULAR DISEASE: Status: ACTIVE | Noted: 2020-02-17

## 2020-02-17 PROBLEM — J45.998 OTHER ASTHMA: Status: ACTIVE | Noted: 2020-02-17

## 2020-02-17 PROBLEM — Z91.048 ALLERGY TO MOLD: Status: ACTIVE | Noted: 2020-02-17

## 2020-02-17 PROBLEM — J30.1 SEASONAL ALLERGIC RHINITIS DUE TO POLLEN: Status: ACTIVE | Noted: 2020-02-17

## 2020-02-17 PROBLEM — D72.10 EOSINOPHILIA: Status: ACTIVE | Noted: 2020-02-17

## 2020-02-17 PROBLEM — J45.50 SEVERE PERSISTENT ASTHMA WITHOUT COMPLICATION: Status: ACTIVE | Noted: 2020-02-17

## 2020-02-17 PROBLEM — I69.998 VERTIGO, LATE EFFECT OF CEREBROVASCULAR DISEASE: Status: ACTIVE | Noted: 2020-02-17

## 2020-02-17 PROBLEM — J30.89 ALLERGIC RHINITIS DUE TO HOUSE DUST MITE: Status: ACTIVE | Noted: 2020-02-17

## 2020-03-04 ENCOUNTER — HOSPITAL ENCOUNTER (OUTPATIENT)
Dept: INFUSION CENTER | Facility: CLINIC | Age: 75
Discharge: HOME | End: 2020-03-04

## 2021-01-14 PROBLEM — R03.0 ELEVATED BLOOD-PRESSURE READING, WITHOUT DIAGNOSIS OF HYPERTENSION: Status: ACTIVE | Noted: 2020-08-19

## 2021-03-10 DIAGNOSIS — Z23 ENCOUNTER FOR IMMUNIZATION: ICD-10-CM

## 2021-05-13 PROBLEM — M20.60 DEFORMITY OF TOE: Status: ACTIVE | Noted: 2021-01-28

## 2021-05-13 PROBLEM — M24.50 CONTRACTURE OF JOINT: Status: ACTIVE | Noted: 2021-01-28

## 2021-05-13 PROBLEM — M20.40 HAMMER TOE: Status: ACTIVE | Noted: 2021-01-28

## 2021-12-07 PROBLEM — G46.5 PURE MOTOR LACUNAR SYNDROME: Status: ACTIVE | Noted: 2021-12-07

## 2023-01-16 PROBLEM — K21.00 GASTROESOPHAGEAL REFLUX DISEASE WITH ESOPHAGITIS WITHOUT HEMORRHAGE: Status: ACTIVE | Noted: 2022-03-17

## 2023-01-16 PROBLEM — K22.70 BARRETT'S ESOPHAGUS WITHOUT DYSPLASIA: Status: ACTIVE | Noted: 2022-03-17

## 2023-01-16 PROBLEM — R89.8 EOSINOPHIL COUNT RAISED: Status: ACTIVE | Noted: 2020-02-17

## 2023-01-16 PROBLEM — M19.90 ARTHRITIS: Status: ACTIVE | Noted: 2022-03-17

## 2023-01-16 PROBLEM — Z80.0 FAMILY HISTORY OF COLON CANCER: Status: ACTIVE | Noted: 2022-03-17

## 2023-01-16 PROBLEM — K80.20 CHOLELITHIASIS WITHOUT CHOLECYSTITIS: Status: ACTIVE | Noted: 2022-03-17

## 2023-01-16 PROBLEM — K57.30 DIVERTICULOSIS OF COLON: Status: ACTIVE | Noted: 2022-03-17

## 2023-01-16 PROBLEM — R42 VERTIGO: Status: ACTIVE | Noted: 2022-03-17

## 2023-01-16 PROBLEM — M17.9 OA (OSTEOARTHRITIS) OF KNEE: Status: ACTIVE | Noted: 2022-09-06

## 2023-01-16 PROBLEM — H91.90 HARD OF HEARING: Status: ACTIVE | Noted: 2022-03-17

## 2023-12-16 DIAGNOSIS — K21.9 GASTROESOPHAGEAL REFLUX DISEASE WITHOUT ESOPHAGITIS: Primary | ICD-10-CM

## 2023-12-18 RX ORDER — FAMOTIDINE 40 MG/1
40 TABLET, FILM COATED ORAL
Qty: 30 TABLET | Refills: 0 | Status: SHIPPED | OUTPATIENT
Start: 2023-12-18

## 2024-01-02 DIAGNOSIS — K21.9 GASTROESOPHAGEAL REFLUX DISEASE WITHOUT ESOPHAGITIS: Primary | ICD-10-CM

## 2024-01-03 RX ORDER — OMEPRAZOLE 20 MG/1
20 CAPSULE, DELAYED RELEASE ORAL
Qty: 90 CAPSULE | Refills: 0 | Status: SHIPPED | OUTPATIENT
Start: 2024-01-03

## 2024-10-04 ENCOUNTER — HOSPITAL ENCOUNTER (OUTPATIENT)
Dept: CT IMAGING | Facility: HOSPITAL | Age: 79
Discharge: HOME | End: 2024-10-04
Payer: COMMERCIAL

## 2024-10-04 DIAGNOSIS — E78.5 HYPERLIPIDEMIA, UNSPECIFIED: ICD-10-CM

## 2024-10-04 PROCEDURE — 75571 CT HRT W/O DYE W/CA TEST: CPT

## 2025-02-21 ENCOUNTER — APPOINTMENT (OUTPATIENT)
Dept: LAB | Facility: HOSPITAL | Age: 80
End: 2025-02-21
Payer: MEDICARE

## 2025-02-21 ENCOUNTER — HOSPITAL ENCOUNTER (OUTPATIENT)
Dept: RADIOLOGY | Facility: HOSPITAL | Age: 80
Discharge: HOME | End: 2025-02-21
Payer: MEDICARE

## 2025-02-21 DIAGNOSIS — M25.519 SHOULDER PAIN, UNSPECIFIED CHRONICITY, UNSPECIFIED LATERALITY: ICD-10-CM

## 2025-02-21 PROCEDURE — 73030 X-RAY EXAM OF SHOULDER: CPT

## 2025-04-24 ENCOUNTER — HOSPITAL ENCOUNTER (OUTPATIENT)
Facility: MEDICAL CENTER | Age: 80
Discharge: HOME | End: 2025-04-24
Payer: MEDICARE

## 2025-04-24 DIAGNOSIS — M19.011 PRIMARY OSTEOARTHRITIS, RIGHT SHOULDER: ICD-10-CM

## 2025-04-24 DIAGNOSIS — M75.81 OTHER SHOULDER LESIONS, RIGHT SHOULDER: ICD-10-CM

## 2025-04-24 PROCEDURE — 73221 MRI JOINT UPR EXTREM W/O DYE: CPT

## 2025-04-24 NOTE — PLAN OF CARE
Occupational Therapy   Treatment    Name: Magan Saleem  MRN: [id number]  Admitting Diagnosis:  Sepsis due to pneumonia       Recommendations:     Discharge Recommendations: Low Intensity Therapy  Discharge Equipment Recommendations:  to be determined by next level of care  Barriers to discharge:       Assessment:     Magan Saleem is a 69 y.o. female with a medical diagnosis of Sepsis due to pneumonia.  She presents with weakness and decline in ADLs. Performance deficits affecting function are weakness, decreased upper extremity function, impaired endurance, impaired balance, decreased safety awareness, impaired self care skills, impaired functional mobility, decreased coordination, impaired cardiopulmonary response to activity.     Rehab Prognosis:  Fair; patient would benefit from acute skilled OT services to address these deficits and reach maximum level of function.       Plan:     Patient to be seen 5 x/week to address the above listed problems via self-care/home management, therapeutic exercises, therapeutic activities  Plan of Care Expires:    Plan of Care Reviewed with: patient    Subjective     Chief Complaint: sepsis   Patient/Family Comments/goals: pt agreeable to OT tx  Pain/Comfort:  Pain Rating 1: 0/10    Objective:     Communicated with: LONDON Robles prior to session.  Patient found HOB elevated with oxygen, PureWick, PICC line upon OT entry to room.    General Precautions: Standard, contact, fall    Orthopedic Precautions:N/A  Braces: N/A  Respiratory Status: Nasal cannula, flow 2 L/min     Occupational Performance:     Bed Mobility:    Not performed     Functional Mobility/Transfers:  Not performed    Activities of Daily Living:  Not performed      [facility] 6 Click ADL:      Treatment & Education:  Pt performed x 20 reps each AAROM shoulder flexion, chest press, IR/ER, and elbow flexion/extension in order to improve BUE strength and endurance to perform ADL and ADL t/f with less assist.     Patient  Problem: Knowledge Deficit  Goal: Patient/family/caregiver demonstrates understanding of disease process, treatment plan, medications, and discharge instructions  Complete learning assessment and assess knowledge base    Interventions:  - Provide teaching at level of understanding  - Provide teaching via preferred learning methods   Outcome: Progressing left HOB elevated with all lines intact, call button in reach, and LONDON Robles notified    GOALS:   Multidisciplinary Problems       Occupational Therapy Goals          Problem: Occupational Therapy    Goal Priority Disciplines Outcome Interventions   Occupational Therapy Goal     OT, PT/OT Progressing    Description: STG:  Pt will perform grooming with setup  Pt will bathe with Min A  Pt will perform UE dressing with Min A  Pt will perform LE dressing with Mod A  Pt will sit EOB x 10 min with CG assistance  Pt will transfer bed/chair/bsc with Mod A  Pt will perform standing task x 3 min with CG assistance  Pt will tolerate 30 minutes of tx without fatigue      LT.Restore to max I with self care and mobility.                         DME Justifications:  No DME recommended requiring DME justifications    Time Tracking:     OT Date of Treatment: 25  OT Start Time: 1022  OT Stop Time: 1041  OT Total Time (min): 19 min    Billable Minutes:Therapeutic Exercise 19 minutes    OT/SAROJ: OT     Number of SAROJ visits since last OT visit: 1    2025

## 2025-07-30 ENCOUNTER — PRE-ADMISSION TESTING (OUTPATIENT)
Dept: PREADMISSION TESTING | Facility: HOSPITAL | Age: 80
End: 2025-07-30
Attending: ORTHOPAEDIC SURGERY
Payer: COMMERCIAL

## 2025-07-30 ENCOUNTER — HOSPITAL ENCOUNTER (OUTPATIENT)
Dept: CARDIOLOGY | Facility: HOSPITAL | Age: 80
Discharge: HOME | End: 2025-07-30
Attending: ORTHOPAEDIC SURGERY
Payer: COMMERCIAL

## 2025-07-30 VITALS
TEMPERATURE: 97.2 F | DIASTOLIC BLOOD PRESSURE: 94 MMHG | SYSTOLIC BLOOD PRESSURE: 157 MMHG | HEIGHT: 65 IN | RESPIRATION RATE: 17 BRPM | WEIGHT: 164.5 LBS | OXYGEN SATURATION: 97 % | BODY MASS INDEX: 27.41 KG/M2 | HEART RATE: 69 BPM

## 2025-07-30 DIAGNOSIS — M19.011 ARTHRITIS OF RIGHT SHOULDER REGION: ICD-10-CM

## 2025-07-30 DIAGNOSIS — Z01.818 PREOP EXAMINATION: ICD-10-CM

## 2025-07-30 DIAGNOSIS — R42 VERTIGO: ICD-10-CM

## 2025-07-30 DIAGNOSIS — J45.50 SEVERE PERSISTENT ASTHMA WITHOUT COMPLICATION: ICD-10-CM

## 2025-07-30 DIAGNOSIS — K22.70 BARRETT'S ESOPHAGUS WITHOUT DYSPLASIA: ICD-10-CM

## 2025-07-30 DIAGNOSIS — M75.111 INCOMPLETE TEAR OF RIGHT ROTATOR CUFF: ICD-10-CM

## 2025-07-30 DIAGNOSIS — R89.8 EOSINOPHIL COUNT RAISED: ICD-10-CM

## 2025-07-30 DIAGNOSIS — M19.011 PRIMARY OSTEOARTHRITIS OF RIGHT SHOULDER: ICD-10-CM

## 2025-07-30 DIAGNOSIS — Z01.818 ENCOUNTER FOR PREOPERATIVE ASSESSMENT: Primary | ICD-10-CM

## 2025-07-30 LAB
ABO + RH BLD: NORMAL
ANION GAP SERPL CALC-SCNC: 7 MEQ/L (ref 3–15)
BLD GP AB SCN SERPL QL: NEGATIVE
BLOOD BANK CMNT PATIENT-IMP: NORMAL
BUN SERPL-MCNC: 19 MG/DL (ref 7–25)
CALCIUM SERPL-MCNC: 10.1 MG/DL (ref 8.6–10.3)
CHLORIDE SERPL-SCNC: 104 MEQ/L (ref 98–107)
CO2 SERPL-SCNC: 28 MEQ/L (ref 21–31)
CREAT SERPL-MCNC: 0.7 MG/DL (ref 0.6–1.2)
D AG BLD QL: POSITIVE
EGFRCR SERPLBLD CKD-EPI 2021: >60 ML/MIN/1.73M*2
ERYTHROCYTE [DISTWIDTH] IN BLOOD BY AUTOMATED COUNT: 14.9 % (ref 11.7–14.4)
GLUCOSE SERPL-MCNC: 93 MG/DL (ref 70–99)
HCT VFR BLD AUTO: 41.5 % (ref 35–45)
HGB BLD-MCNC: 13.6 G/DL (ref 11.8–15.7)
LABORATORY COMMENT REPORT: NORMAL
MCH RBC QN AUTO: 31.9 PG (ref 28–33.2)
MCHC RBC AUTO-ENTMCNC: 32.8 G/DL (ref 32.2–35.5)
MCV RBC AUTO: 97.2 FL (ref 83–98)
PLATELET # BLD AUTO: 296 K/UL (ref 150–369)
PMV BLD AUTO: 9.8 FL (ref 9.4–12.3)
POTASSIUM SERPL-SCNC: 4.9 MEQ/L (ref 3.5–5.1)
RBC # BLD AUTO: 4.27 M/UL (ref 3.93–5.22)
SODIUM SERPL-SCNC: 139 MEQ/L (ref 136–145)
SPECIMEN EXP DATE BLD: NORMAL
WBC # BLD AUTO: 7.45 K/UL (ref 3.8–10.5)

## 2025-07-30 PROCEDURE — 80048 BASIC METABOLIC PNL TOTAL CA: CPT

## 2025-07-30 PROCEDURE — 36415 COLL VENOUS BLD VENIPUNCTURE: CPT

## 2025-07-30 PROCEDURE — 86901 BLOOD TYPING SEROLOGIC RH(D): CPT

## 2025-07-30 PROCEDURE — 85027 COMPLETE CBC AUTOMATED: CPT

## 2025-07-30 PROCEDURE — 99215 OFFICE O/P EST HI 40 MIN: CPT | Performed by: HOSPITALIST

## 2025-07-30 NOTE — H&P
Division of [facility] Medicine -  Pre-Operative Consultation       Patient Name: Thalia Hansen  Referring Surgeon: Kai Arce    Reason for Referral: Pre-Operative Evaluation  Surgical Procedure: Right Anatomic Total Shoulder Arthroplasty versus Right Reverse Total Shoulder Arthroplasty   Operative Date: 8/133/2025  Other Providers:      PCP: Giancarlo Strickland Jr., MD        HISTORY OF PRESENT ILLNESS      Thalia Hansen is a 79 y.o. female presenting today to the [facility] at [facility] for pre-operative evaluation prior to planned surgery.    In regards to medical history:  Ao aneurism  Severe persistent Asthma / eosinophilic asthma  GERD    The patient denies any current or recent chest pain or pressure, dyspnea, cough, sputum, fevers, chills, abdominal pain, nausea, vomiting, diarrhea or other symptoms.     Functionally, the patient is able to ascend a flight or so of stairs with no dyspnea or chest pain.     The patient denies, on specific questioning, the following:  No history of MI, arrhythmia,or CHF.  No history of SIMONE.  No history of DVT/PE.  No history of CVA.  No history of DM.   No history of CKD.     PAST MEDICAL AND SURGICAL HISTORY      Past Medical History:   Diagnosis Date    Aortic aneurysm (CMS/HCC)     Arthritis     Asthma     GERD (gastroesophageal reflux disease)     [place] (hard of hearing)     bilateral hearing aids    Low BP     per patient       Past Surgical History   Procedure Laterality Date    Cholecystectomy      Joint replacement Bilateral     Replacement total knee Left     Tubal ligation         MEDICATIONS        Current Outpatient Medications:     albuterol HFA 90 mcg/actuation inhaler, Inhale 2 puffs every 4 (four) hours as needed for wheezing., Disp: , Rfl:     benralizumab (FASENRA SUBQ), Inject under the skin. Injection 2 months, Disp: , Rfl:     budesonide-formoteroL (SYMBICORT) 160-4.5 mcg/actuation inhaler,  "Inhale 2 puffs every morning., Disp: , Rfl:     cholecalciferol, vitamin D3, 50 mcg (2,000 unit) tablet, Take 2,000 Units by mouth daily., Disp: , Rfl:     Lactobac 40-Bifido 3-S.thermop (PROBIOTIC) 100 billion cell capsule, Take 1 capsule by mouth once daily., Disp: , Rfl:     magnesium oxide 200 mg magnesium tablet, Take 1 tablet by mouth every evening., Disp: , Rfl:     omeprazole (PriLOSEC) 20 mg capsule, Take 20 mg by mouth daily before breakfast., Disp: , Rfl:     TURMERIC ORAL, Take 1,500 mg by mouth every morning., Disp: , Rfl:     ALLERGIES      Clopidogrel, Lovastatin, Penicillins, Celecoxib, and Rosuvastatin    FAMILY HISTORY      family history is not on file.    Denies any prior known family history of DVTs/PEs/clotting disorder    SOCIAL HISTORY      Social History     Tobacco Use    Smoking status: Former     Types: Cigarettes    Smokeless tobacco: Never    Tobacco comments:     Quit 40 years ago   Substance Use Topics    Alcohol use: Yes     Alcohol/week: 2.0 standard drinks of alcohol     Types: 2 Glasses of wine per week    Drug use: Never       REVIEW OF SYSTEMS      All other systems reviewed and negative except as noted in HPI    PHYSICAL EXAMINATION      Visit Vitals  BP (!) 157/94 (BP Location: Left upper arm, Patient Position: Sitting)   Pulse 69   Temp 36.2 °C (97.2 °F) (Temporal)   Resp 17   Ht 1.651 m (5' 5\")   Wt 74.6 kg (164 lb 8 oz)   SpO2 97%   BMI 27.37 kg/m²     Body mass index is 27.37 kg/m².    Physical Exam  GENERAL APPEARANCE  Awake, alert    MUCUS MEMBRANES  Moist    LUNGS  CTAB, no w/r/r    CHEST  RRR, no m/g/r    ABDOMEN  Soft, NT, ND, +BS    EXTREMITIES  No c/c/e    SKIN  No obvious rash    HEENT  Anicteric    NEURO  Moving all extremities, Ox3, coherent, no dysarthria        LABS / EKG        Labs  Results from last 7 days   Lab Units 07/30/25  1115   WBC K/uL 7.45   HEMOGLOBIN g/dL 13.6   HEMATOCRIT % 41.5   PLATELETS K/uL 296    Results from last 7 days   Lab Units " 07/30/25  1115   SODIUM mEQ/L 139   POTASSIUM mEQ/L 4.9   CHLORIDE mEQ/L 104   CO2 mEQ/L 28   BUN mg/dL 19   CREATININE mg/dL 0.7   GLUCOSE mg/dL 93                                          Microbiology Results       ** No results found for the last 720 hours. **             ECG/Telemetry  Normal EKG 3/2025 in [facility]     ASSESSMENT AND PLAN         Vertigo  Exacerbated by laying flat    Severe persistent asthma without complication  On fazenra, last dose on 7/29 which is two weeks prior to surgery  She on on symbicort  Last exacerbation 2 years ago  Last need for albuterol rescue was over year ago  Never intubated      Huynh's esophagus without dysplasia  On PPI    Eosinophil count raised  Eosinophilic asthma    Primary osteoarthritis of right shoulder  For surgery    Encounter for preoperative assessment  -Patient has no cardiac symptoms.   -Patient exhibits no signs/symptoms of angina, arrythmia or decompenstated CHF.   -Patient does not have new significant EKG changes  -Patient has been advised to avoid NSAIDs 5-7 days prior to surgery unless otherwise directed by surgeon  - seen and cleared by cardiologist for surgery per [facility] records    ? Revised Cardiac Index = 0  Interpretation: This score belongs to Class I of risk for perioperative cardiac events with a risk percentage of 0.4%.  Patient is medically optimal for surgery.         Further comments:  Resume supplements when OK with surgical team.  I would encourage incentive spirometry to assist with minimizing muna-operative pulmonary risk.  DVT prophylaxis and timing of such per the discretion of the surgeon.     Please do not hesitate to contact [facility] during the upcoming hospitalization with any questions or concerns.     Lisha Pringle MD  7/30/2025

## 2025-07-30 NOTE — ASSESSMENT & PLAN NOTE
On fazenra, last dose on 7/29 which is two weeks prior to surgery  She on on symbicort  Last exacerbation 2 years ago  Last need for albuterol rescue was over year ago  Never intubated

## 2025-07-30 NOTE — ASSESSMENT & PLAN NOTE
-Patient has no cardiac symptoms.   -Patient exhibits no signs/symptoms of angina, arrythmia or decompenstated CHF.   -Patient does not have new significant EKG changes  -Patient has been advised to avoid NSAIDs 5-7 days prior to surgery unless otherwise directed by surgeon  - seen and cleared by cardiologist for surgery per EP[facility] records    ? Revised Cardiac Index = 0  Interpretation: This score belongs to Class I of risk for perioperative cardiac events with a risk percentage of 0.4%.  Patient is medically optimal for surgery.

## 2025-08-13 ENCOUNTER — ANESTHESIA (OUTPATIENT)
Dept: OPERATING ROOM | Facility: HOSPITAL | Age: 80
Setting detail: HOSPITAL OUTPATIENT SURGERY
End: 2025-08-13
Payer: MEDICARE

## 2025-08-13 ENCOUNTER — APPOINTMENT (OUTPATIENT)
Dept: RADIOLOGY | Facility: HOSPITAL | Age: 80
End: 2025-08-13
Attending: NURSE PRACTITIONER
Payer: MEDICARE

## 2025-08-13 ENCOUNTER — HOSPITAL ENCOUNTER (OUTPATIENT)
Facility: HOSPITAL | Age: 80
Discharge: HOME | End: 2025-08-14
Attending: ORTHOPAEDIC SURGERY | Admitting: ORTHOPAEDIC SURGERY
Payer: MEDICARE

## 2025-08-13 DIAGNOSIS — R06.02 SOB (SHORTNESS OF BREATH): Primary | ICD-10-CM

## 2025-08-13 DIAGNOSIS — Z98.890 S/P SHOULDER SURGERY: ICD-10-CM

## 2025-08-13 PROBLEM — J45.909 ASTHMA: Status: ACTIVE | Noted: 2025-08-13

## 2025-08-13 LAB
ABO + RH BLD: NORMAL
D AG BLD QL: POSITIVE

## 2025-08-13 PROCEDURE — 37000001 HC ANESTHESIA GENERAL: Performed by: ORTHOPAEDIC SURGERY

## 2025-08-13 PROCEDURE — 0RRJ0JZ REPLACEMENT OF RIGHT SHOULDER JOINT WITH SYNTHETIC SUBSTITUTE, OPEN APPROACH: ICD-10-PCS | Performed by: ORTHOPAEDIC SURGERY

## 2025-08-13 PROCEDURE — 71000001 HC PACU PHASE 1 INITIAL 30MIN: Performed by: ORTHOPAEDIC SURGERY

## 2025-08-13 PROCEDURE — 36000005 HC OR LEVEL 5 INITIAL 30MIN: Performed by: ORTHOPAEDIC SURGERY

## 2025-08-13 PROCEDURE — 71045 X-RAY EXAM CHEST 1 VIEW: CPT

## 2025-08-13 PROCEDURE — 93005 ELECTROCARDIOGRAM TRACING: CPT | Mod: 59 | Performed by: HOSPITALIST

## 2025-08-13 PROCEDURE — 0LS30ZZ REPOSITION RIGHT UPPER ARM TENDON, OPEN APPROACH: ICD-10-PCS | Performed by: ORTHOPAEDIC SURGERY

## 2025-08-13 PROCEDURE — 63600000 HC DRUGS/DETAIL CODE: Mod: JZ | Performed by: NURSE PRACTITIONER

## 2025-08-13 PROCEDURE — 25000000 HC PHARMACY GENERAL: Performed by: SPECIALIST

## 2025-08-13 PROCEDURE — 73020 X-RAY EXAM OF SHOULDER: CPT | Mod: RT

## 2025-08-13 PROCEDURE — C1713 ANCHOR/SCREW BN/BN,TIS/BN: HCPCS | Performed by: ORTHOPAEDIC SURGERY

## 2025-08-13 PROCEDURE — 63600000 HC DRUGS/DETAIL CODE: Performed by: SPECIALIST

## 2025-08-13 PROCEDURE — 27200000 HC STERILE SUPPLY: Performed by: ORTHOPAEDIC SURGERY

## 2025-08-13 PROCEDURE — C1776 JOINT DEVICE (IMPLANTABLE): HCPCS | Performed by: ORTHOPAEDIC SURGERY

## 2025-08-13 PROCEDURE — 36000015 HC OR LEVEL 5 EA ADDL MIN: Performed by: ORTHOPAEDIC SURGERY

## 2025-08-13 PROCEDURE — 63700000 HC SELF-ADMINISTRABLE DRUG: Performed by: NURSE PRACTITIONER

## 2025-08-13 PROCEDURE — 25800000 HC PHARMACY IV SOLUTIONS: Performed by: NURSE PRACTITIONER

## 2025-08-13 PROCEDURE — 99232 SBSQ HOSP IP/OBS MODERATE 35: CPT | Performed by: HOSPITALIST

## 2025-08-13 PROCEDURE — 71000011 HC PACU PHASE 1 EA ADDL MIN: Performed by: ORTHOPAEDIC SURGERY

## 2025-08-13 PROCEDURE — 36415 COLL VENOUS BLD VENIPUNCTURE: CPT | Performed by: ORTHOPAEDIC SURGERY

## 2025-08-13 DEVICE — GLENOID ALL-POLY PEGGED 46MM: Type: IMPLANTABLE DEVICE | Site: SHOULDER | Status: FUNCTIONAL

## 2025-08-13 DEVICE — KIT STEM: Type: IMPLANTABLE DEVICE | Site: SHOULDER | Status: FUNCTIONAL

## 2025-08-13 DEVICE — CEMENT BONE SIMPLEX W/TOBRAMYCIN: Type: IMPLANTABLE DEVICE | Site: SHOULDER | Status: FUNCTIONAL

## 2025-08-13 DEVICE — IMPLANTABLE DEVICE: Type: IMPLANTABLE DEVICE | Site: SHOULDER | Status: FUNCTIONAL

## 2025-08-13 RX ORDER — IPRATROPIUM BROMIDE AND ALBUTEROL SULFATE 2.5; .5 MG/3ML; MG/3ML
3 SOLUTION RESPIRATORY (INHALATION) ONCE AS NEEDED
Status: COMPLETED | OUTPATIENT
Start: 2025-08-13 | End: 2025-08-13

## 2025-08-13 RX ORDER — LIDOCAINE HYDROCHLORIDE 10 MG/ML
INJECTION, SOLUTION INFILTRATION; PERINEURAL AS NEEDED
Status: DISCONTINUED | OUTPATIENT
Start: 2025-08-13 | End: 2025-08-13 | Stop reason: SURG

## 2025-08-13 RX ORDER — METOCLOPRAMIDE HYDROCHLORIDE 5 MG/ML
10 INJECTION INTRAMUSCULAR; INTRAVENOUS
Status: DISCONTINUED | OUTPATIENT
Start: 2025-08-13 | End: 2025-08-13 | Stop reason: HOSPADM

## 2025-08-13 RX ORDER — OXYCODONE HYDROCHLORIDE 5 MG/1
5 TABLET ORAL EVERY 4 HOURS PRN
Status: DISCONTINUED | OUTPATIENT
Start: 2025-08-13 | End: 2025-08-14 | Stop reason: HOSPADM

## 2025-08-13 RX ORDER — DEXTROSE 40 %
15-30 GEL (GRAM) ORAL AS NEEDED
Status: DISCONTINUED | OUTPATIENT
Start: 2025-08-13 | End: 2025-08-14 | Stop reason: HOSPADM

## 2025-08-13 RX ORDER — AMOXICILLIN 250 MG
1 CAPSULE ORAL 2 TIMES DAILY
Status: DISCONTINUED | OUTPATIENT
Start: 2025-08-13 | End: 2025-08-14 | Stop reason: HOSPADM

## 2025-08-13 RX ORDER — DEXTROSE 40 %
15-30 GEL (GRAM) ORAL AS NEEDED
Status: DISCONTINUED | OUTPATIENT
Start: 2025-08-13 | End: 2025-08-13 | Stop reason: HOSPADM

## 2025-08-13 RX ORDER — ALUMINUM HYDROXIDE, MAGNESIUM HYDROXIDE, AND SIMETHICONE 1200; 120; 1200 MG/30ML; MG/30ML; MG/30ML
30 SUSPENSION ORAL EVERY 4 HOURS PRN
Status: DISCONTINUED | OUTPATIENT
Start: 2025-08-13 | End: 2025-08-14 | Stop reason: HOSPADM

## 2025-08-13 RX ORDER — HYDROMORPHONE HYDROCHLORIDE 1 MG/ML
0.5 INJECTION, SOLUTION INTRAMUSCULAR; INTRAVENOUS; SUBCUTANEOUS
Status: DISCONTINUED | OUTPATIENT
Start: 2025-08-13 | End: 2025-08-13 | Stop reason: HOSPADM

## 2025-08-13 RX ORDER — DIPHENHYDRAMINE HCL 25 MG
25 CAPSULE ORAL EVERY 6 HOURS PRN
Status: DISCONTINUED | OUTPATIENT
Start: 2025-08-13 | End: 2025-08-14 | Stop reason: HOSPADM

## 2025-08-13 RX ORDER — FENTANYL CITRATE 50 UG/ML
50 INJECTION, SOLUTION INTRAMUSCULAR; INTRAVENOUS EVERY 5 MIN PRN
Status: DISCONTINUED | OUTPATIENT
Start: 2025-08-13 | End: 2025-08-13 | Stop reason: HOSPADM

## 2025-08-13 RX ORDER — ONDANSETRON HYDROCHLORIDE 2 MG/ML
4 INJECTION, SOLUTION INTRAVENOUS
Status: DISCONTINUED | OUTPATIENT
Start: 2025-08-13 | End: 2025-08-13 | Stop reason: HOSPADM

## 2025-08-13 RX ORDER — DEXTROSE 50 % IN WATER (D50W) INTRAVENOUS SYRINGE
25 AS NEEDED
Status: DISCONTINUED | OUTPATIENT
Start: 2025-08-13 | End: 2025-08-13 | Stop reason: HOSPADM

## 2025-08-13 RX ORDER — PANTOPRAZOLE SODIUM 20 MG/1
20 TABLET, DELAYED RELEASE ORAL DAILY
Status: DISCONTINUED | OUTPATIENT
Start: 2025-08-13 | End: 2025-08-14 | Stop reason: HOSPADM

## 2025-08-13 RX ORDER — ADHESIVE BANDAGE 7/8"
15-30 BANDAGE TOPICAL AS NEEDED
Status: DISCONTINUED | OUTPATIENT
Start: 2025-08-13 | End: 2025-08-13 | Stop reason: HOSPADM

## 2025-08-13 RX ORDER — OXYCODONE HYDROCHLORIDE 5 MG/1
10 TABLET ORAL EVERY 4 HOURS PRN
Status: DISCONTINUED | OUTPATIENT
Start: 2025-08-13 | End: 2025-08-14 | Stop reason: HOSPADM

## 2025-08-13 RX ORDER — FENTANYL CITRATE 50 UG/ML
INJECTION, SOLUTION INTRAMUSCULAR; INTRAVENOUS AS NEEDED
Status: DISCONTINUED | OUTPATIENT
Start: 2025-08-13 | End: 2025-08-13 | Stop reason: SURG

## 2025-08-13 RX ORDER — ROPIVACAINE HYDROCHLORIDE 5 MG/ML
INJECTION, SOLUTION EPIDURAL; INFILTRATION; PERINEURAL
Status: COMPLETED | OUTPATIENT
Start: 2025-08-13 | End: 2025-08-13

## 2025-08-13 RX ORDER — MEPERIDINE HYDROCHLORIDE 25 MG/ML
12.5 INJECTION INTRAMUSCULAR; INTRAVENOUS; SUBCUTANEOUS EVERY 10 MIN PRN
Status: DISCONTINUED | OUTPATIENT
Start: 2025-08-13 | End: 2025-08-13 | Stop reason: HOSPADM

## 2025-08-13 RX ORDER — ADHESIVE BANDAGE 7/8"
15-30 BANDAGE TOPICAL AS NEEDED
Status: DISCONTINUED | OUTPATIENT
Start: 2025-08-13 | End: 2025-08-14 | Stop reason: HOSPADM

## 2025-08-13 RX ORDER — BISACODYL 10 MG/1
10 SUPPOSITORY RECTAL DAILY PRN
Status: DISCONTINUED | OUTPATIENT
Start: 2025-08-13 | End: 2025-08-14 | Stop reason: HOSPADM

## 2025-08-13 RX ORDER — POLYETHYLENE GLYCOL 3350 17 G/17G
17 POWDER, FOR SOLUTION ORAL DAILY
Status: DISCONTINUED | OUTPATIENT
Start: 2025-08-13 | End: 2025-08-14 | Stop reason: HOSPADM

## 2025-08-13 RX ORDER — ONDANSETRON HYDROCHLORIDE 2 MG/ML
4 INJECTION, SOLUTION INTRAVENOUS EVERY 8 HOURS PRN
Status: DISCONTINUED | OUTPATIENT
Start: 2025-08-13 | End: 2025-08-14 | Stop reason: HOSPADM

## 2025-08-13 RX ORDER — ACETAMINOPHEN 325 MG/1
650 TABLET ORAL EVERY 4 HOURS PRN
Status: DISCONTINUED | OUTPATIENT
Start: 2025-08-13 | End: 2025-08-14 | Stop reason: HOSPADM

## 2025-08-13 RX ORDER — PHENYLEPHRINE HYDROCHLORIDE 10 MG/ML
INJECTION INTRAVENOUS AS NEEDED
Status: DISCONTINUED | OUTPATIENT
Start: 2025-08-13 | End: 2025-08-13 | Stop reason: SURG

## 2025-08-13 RX ORDER — ONDANSETRON HYDROCHLORIDE 2 MG/ML
INJECTION, SOLUTION INTRAVENOUS AS NEEDED
Status: DISCONTINUED | OUTPATIENT
Start: 2025-08-13 | End: 2025-08-13 | Stop reason: SURG

## 2025-08-13 RX ORDER — DEXTROSE 50 % IN WATER (D50W) INTRAVENOUS SYRINGE
25 AS NEEDED
Status: DISCONTINUED | OUTPATIENT
Start: 2025-08-13 | End: 2025-08-14 | Stop reason: HOSPADM

## 2025-08-13 RX ORDER — DIPHENHYDRAMINE HCL 50 MG/ML
12.5 VIAL (ML) INJECTION
Status: DISCONTINUED | OUTPATIENT
Start: 2025-08-13 | End: 2025-08-13 | Stop reason: HOSPADM

## 2025-08-13 RX ORDER — MIDAZOLAM HYDROCHLORIDE 2 MG/2ML
INJECTION, SOLUTION INTRAMUSCULAR; INTRAVENOUS AS NEEDED
Status: DISCONTINUED | OUTPATIENT
Start: 2025-08-13 | End: 2025-08-13 | Stop reason: SURG

## 2025-08-13 RX ORDER — NAPROXEN SODIUM 220 MG/1
81 TABLET, FILM COATED ORAL DAILY
Status: DISCONTINUED | OUTPATIENT
Start: 2025-08-14 | End: 2025-08-14 | Stop reason: HOSPADM

## 2025-08-13 RX ORDER — ALBUTEROL SULFATE 0.83 MG/ML
2.5 SOLUTION RESPIRATORY (INHALATION) EVERY 4 HOURS PRN
Status: DISCONTINUED | OUTPATIENT
Start: 2025-08-13 | End: 2025-08-14 | Stop reason: HOSPADM

## 2025-08-13 RX ORDER — ROCURONIUM BROMIDE 10 MG/ML
INJECTION, SOLUTION INTRAVENOUS AS NEEDED
Status: DISCONTINUED | OUTPATIENT
Start: 2025-08-13 | End: 2025-08-13 | Stop reason: SURG

## 2025-08-13 RX ORDER — MIDAZOLAM HYDROCHLORIDE 2 MG/2ML
1 INJECTION, SOLUTION INTRAMUSCULAR; INTRAVENOUS AS NEEDED
Status: DISCONTINUED | OUTPATIENT
Start: 2025-08-13 | End: 2025-08-13 | Stop reason: HOSPADM

## 2025-08-13 RX ORDER — DEXAMETHASONE SODIUM PHOSPHATE 4 MG/ML
INJECTION, SOLUTION INTRA-ARTICULAR; INTRALESIONAL; INTRAMUSCULAR; INTRAVENOUS; SOFT TISSUE AS NEEDED
Status: DISCONTINUED | OUTPATIENT
Start: 2025-08-13 | End: 2025-08-13 | Stop reason: SURG

## 2025-08-13 RX ORDER — PROPOFOL 10 MG/ML
INJECTION, EMULSION INTRAVENOUS AS NEEDED
Status: DISCONTINUED | OUTPATIENT
Start: 2025-08-13 | End: 2025-08-13 | Stop reason: SURG

## 2025-08-13 RX ORDER — SODIUM CHLORIDE 9 MG/ML
INJECTION, SOLUTION INTRAVENOUS CONTINUOUS
Status: DISCONTINUED | OUTPATIENT
Start: 2025-08-13 | End: 2025-08-14 | Stop reason: HOSPADM

## 2025-08-13 RX ADMIN — PROPOFOL 160 MG: 10 INJECTION, EMULSION INTRAVENOUS at 10:32

## 2025-08-13 RX ADMIN — PHENYLEPHRINE HYDROCHLORIDE 100 MCG: 10 INJECTION INTRAVENOUS at 11:01

## 2025-08-13 RX ADMIN — ROCURONIUM BROMIDE 50 MG: 10 INJECTION, SOLUTION INTRAVENOUS at 10:32

## 2025-08-13 RX ADMIN — SODIUM CHLORIDE: 9 INJECTION, SOLUTION INTRAVENOUS at 22:01

## 2025-08-13 RX ADMIN — PHENYLEPHRINE HYDROCHLORIDE 100 MCG: 10 INJECTION INTRAVENOUS at 10:47

## 2025-08-13 RX ADMIN — MIDAZOLAM HYDROCHLORIDE 1 MG: 1 INJECTION, SOLUTION INTRAMUSCULAR; INTRAVENOUS at 10:17

## 2025-08-13 RX ADMIN — ROCURONIUM BROMIDE 20 MG: 10 INJECTION, SOLUTION INTRAVENOUS at 11:38

## 2025-08-13 RX ADMIN — ROPIVACAINE HYDROCHLORIDE 20 ML: 5 INJECTION, SOLUTION EPIDURAL; INFILTRATION; PERINEURAL at 10:30

## 2025-08-13 RX ADMIN — SODIUM CHLORIDE 1 G: 9 INJECTION, SOLUTION INTRAVENOUS at 22:00

## 2025-08-13 RX ADMIN — LIDOCAINE HYDROCHLORIDE 5 ML: 10 INJECTION, SOLUTION INFILTRATION; PERINEURAL at 10:32

## 2025-08-13 RX ADMIN — SODIUM CHLORIDE 1000 MG: 9 INJECTION, SOLUTION INTRAVENOUS at 10:12

## 2025-08-13 RX ADMIN — SUGAMMADEX 200 MG: 100 INJECTION, SOLUTION INTRAVENOUS at 12:09

## 2025-08-13 RX ADMIN — FENTANYL CITRATE 50 MCG: 50 INJECTION INTRAMUSCULAR; INTRAVENOUS at 12:15

## 2025-08-13 RX ADMIN — DOCUSATE SODIUM AND SENNOSIDES 1 TABLET: 8.6; 5 TABLET, FILM COATED ORAL at 22:00

## 2025-08-13 RX ADMIN — ONDANSETRON 4 MG: 2 INJECTION INTRAMUSCULAR; INTRAVENOUS at 11:16

## 2025-08-13 RX ADMIN — PHENYLEPHRINE HYDROCHLORIDE 100 MCG: 10 INJECTION INTRAVENOUS at 11:14

## 2025-08-13 RX ADMIN — PHENYLEPHRINE HYDROCHLORIDE 100 MCG: 10 INJECTION INTRAVENOUS at 11:38

## 2025-08-13 RX ADMIN — FENTANYL CITRATE 50 MCG: 50 INJECTION INTRAMUSCULAR; INTRAVENOUS at 10:18

## 2025-08-13 RX ADMIN — DEXAMETHASONE SODIUM PHOSPHATE 8 MG: 4 INJECTION, SOLUTION INTRA-ARTICULAR; INTRALESIONAL; INTRAMUSCULAR; INTRAVENOUS; SOFT TISSUE at 11:16

## 2025-08-13 RX ADMIN — MIDAZOLAM HYDROCHLORIDE 1 MG: 1 INJECTION, SOLUTION INTRAMUSCULAR; INTRAVENOUS at 10:32

## 2025-08-13 RX ADMIN — IPRATROPIUM BROMIDE AND ALBUTEROL SULFATE 3 ML: .5; 3 SOLUTION RESPIRATORY (INHALATION) at 14:05

## 2025-08-13 ASSESSMENT — PAIN SCALES - GENERAL: PAIN_LEVEL: 0

## 2025-08-13 ASSESSMENT — COGNITIVE AND FUNCTIONAL STATUS - GENERAL
STANDING UP FROM CHAIR USING ARMS: 3 - A LITTLE
CLIMB 3 TO 5 STEPS WITH RAILING: 3 - A LITTLE
STANDING UP FROM CHAIR USING ARMS: 3 - A LITTLE
WALKING IN HOSPITAL ROOM: 3 - A LITTLE
CLIMB 3 TO 5 STEPS WITH RAILING: 3 - A LITTLE
MOVING TO AND FROM BED TO CHAIR: 3 - A LITTLE
WALKING IN HOSPITAL ROOM: 3 - A LITTLE
MOVING TO AND FROM BED TO CHAIR: 3 - A LITTLE

## 2025-08-14 VITALS
DIASTOLIC BLOOD PRESSURE: 65 MMHG | SYSTOLIC BLOOD PRESSURE: 102 MMHG | TEMPERATURE: 98 F | BODY MASS INDEX: 27.46 KG/M2 | HEART RATE: 81 BPM | OXYGEN SATURATION: 96 % | RESPIRATION RATE: 18 BRPM | WEIGHT: 165 LBS

## 2025-08-14 LAB
ANION GAP SERPL CALC-SCNC: 7 MEQ/L (ref 3–15)
ATRIAL RATE: 70
BUN SERPL-MCNC: 15 MG/DL (ref 7–25)
CALCIUM SERPL-MCNC: 8.9 MG/DL (ref 8.6–10.3)
CHLORIDE SERPL-SCNC: 109 MEQ/L (ref 98–107)
CO2 SERPL-SCNC: 24 MEQ/L (ref 21–31)
CREAT SERPL-MCNC: 0.7 MG/DL (ref 0.6–1.2)
EGFRCR SERPLBLD CKD-EPI 2021: >60 ML/MIN/1.73M*2
ERYTHROCYTE [DISTWIDTH] IN BLOOD BY AUTOMATED COUNT: 14.7 % (ref 11.7–14.4)
GLUCOSE SERPL-MCNC: 110 MG/DL (ref 70–99)
HCT VFR BLD AUTO: 35.6 % (ref 35–45)
HGB BLD-MCNC: 11.6 G/DL (ref 11.8–15.7)
MCH RBC QN AUTO: 32.1 PG (ref 28–33.2)
MCHC RBC AUTO-ENTMCNC: 32.6 G/DL (ref 32.2–35.5)
MCV RBC AUTO: 98.6 FL (ref 83–98)
P AXIS: 15
PLATELET # BLD AUTO: 247 K/UL (ref 150–369)
PMV BLD AUTO: 9.9 FL (ref 9.4–12.3)
POTASSIUM SERPL-SCNC: 4.2 MEQ/L (ref 3.5–5.1)
PR INTERVAL: 140
QRS DURATION: 90
QT INTERVAL: 442
QTC CALCULATION(BAZETT): 477
R AXIS: 16
RBC # BLD AUTO: 3.61 M/UL (ref 3.93–5.22)
SODIUM SERPL-SCNC: 140 MEQ/L (ref 136–145)
T WAVE AXIS: 29
VENTRICULAR RATE: 70
WBC # BLD AUTO: 13.84 K/UL (ref 3.8–10.5)

## 2025-08-14 PROCEDURE — 63700000 HC SELF-ADMINISTRABLE DRUG: Performed by: NURSE PRACTITIONER

## 2025-08-14 PROCEDURE — 80048 BASIC METABOLIC PNL TOTAL CA: CPT | Performed by: NURSE PRACTITIONER

## 2025-08-14 PROCEDURE — 99232 SBSQ HOSP IP/OBS MODERATE 35: CPT | Performed by: HOSPITALIST

## 2025-08-14 PROCEDURE — 97535 SELF CARE MNGMENT TRAINING: CPT | Mod: GO

## 2025-08-14 PROCEDURE — 36415 COLL VENOUS BLD VENIPUNCTURE: CPT | Performed by: NURSE PRACTITIONER

## 2025-08-14 PROCEDURE — 85027 COMPLETE CBC AUTOMATED: CPT | Performed by: NURSE PRACTITIONER

## 2025-08-14 PROCEDURE — 97166 OT EVAL MOD COMPLEX 45 MIN: CPT | Mod: GO

## 2025-08-14 PROCEDURE — 97162 PT EVAL MOD COMPLEX 30 MIN: CPT | Mod: GP

## 2025-08-14 PROCEDURE — 25000000 HC PHARMACY GENERAL: Performed by: NURSE PRACTITIONER

## 2025-08-14 RX ORDER — OXYCODONE HYDROCHLORIDE 5 MG/1
5 TABLET ORAL EVERY 6 HOURS PRN
Start: 2025-08-14

## 2025-08-14 RX ORDER — NAPROXEN SODIUM 220 MG/1
81 TABLET, FILM COATED ORAL DAILY
Start: 2025-08-14

## 2025-08-14 RX ORDER — ACETAMINOPHEN 500 MG
1000 TABLET ORAL
Start: 2025-08-14

## 2025-08-14 RX ADMIN — OXYCODONE HYDROCHLORIDE 10 MG: 5 TABLET ORAL at 04:34

## 2025-08-14 RX ADMIN — ASPIRIN 81 MG: 81 TABLET, CHEWABLE ORAL at 08:49

## 2025-08-14 RX ADMIN — ACETAMINOPHEN 650 MG: 325 TABLET ORAL at 02:53

## 2025-08-14 RX ADMIN — POLYETHYLENE GLYCOL 3350 17 G: 17 POWDER, FOR SOLUTION ORAL at 08:48

## 2025-08-14 RX ADMIN — MOMETASONE FUROATE AND FORMOTEROL FUMARATE DIHYDRATE 2 PUFF: 100; 5 AEROSOL RESPIRATORY (INHALATION) at 08:49

## 2025-08-14 RX ADMIN — DOCUSATE SODIUM AND SENNOSIDES 1 TABLET: 8.6; 5 TABLET, FILM COATED ORAL at 08:48

## 2025-08-14 RX ADMIN — ACETAMINOPHEN 650 MG: 325 TABLET ORAL at 10:22

## 2025-08-14 RX ADMIN — PANTOPRAZOLE SODIUM 20 MG: 20 TABLET, DELAYED RELEASE ORAL at 08:48

## 2025-08-14 RX ADMIN — OXYCODONE HYDROCHLORIDE 10 MG: 5 TABLET ORAL at 10:22

## 2025-08-14 ASSESSMENT — COGNITIVE AND FUNCTIONAL STATUS - GENERAL
EATING MEALS: 4 - NONE
AFFECT: WFL
WALKING IN HOSPITAL ROOM: 4 - NONE
STANDING UP FROM CHAIR USING ARMS: 4 - NONE
HELP NEEDED FOR BATHING: 3 - A LITTLE
CLIMB 3 TO 5 STEPS WITH RAILING: 3 - A LITTLE
CLIMB 3 TO 5 STEPS WITH RAILING: 4 - NONE
HELP NEEDED FOR PERSONAL GROOMING: 4 - NONE
MOVING TO AND FROM BED TO CHAIR: 4 - NONE
AFFECT: WFL
TOILETING: 4 - NONE
DRESSING REGULAR UPPER BODY CLOTHING: 3 - A LITTLE
DRESSING REGULAR LOWER BODY CLOTHING: 3 - A LITTLE
MOVING TO AND FROM BED TO CHAIR: 3 - A LITTLE
STANDING UP FROM CHAIR USING ARMS: 3 - A LITTLE
WALKING IN HOSPITAL ROOM: 3 - A LITTLE

## (undated) DEVICE — SLEEVE SCD COMFORT KNEE LENGTH MED

## (undated) DEVICE — MANIFOLD FOUR PORT NEPTUNE

## (undated) DEVICE — BULB SYRINGE IRRIGATION STERILE

## (undated) DEVICE — COVER MAYO STAND ST 30/CS

## (undated) DEVICE — SPONGE LAP 18X18 SAFE-T RFID ENHANCED XRAY

## (undated) DEVICE — PIN DRILL 3.2MM CS/EDGE

## (undated) DEVICE — NEEDLE HALF CIRCLE TAPER MED GALLES FASCIA

## (undated) DEVICE — HOOD T7 PLUS

## (undated) DEVICE — TOWEL SURGICAL W17XL27IN BLUE COTTON STANDARD PREWASHED DELI

## (undated) DEVICE — GLOVE SZ 8 PROTEXIS PI

## (undated) DEVICE — TIP SUCTION FRAZIER 12FR

## (undated) DEVICE — SUTURE MONOCRYL 3-0  Y497G

## (undated) DEVICE — PACK MLHS SHOULDER PACK RFID STDZ

## (undated) DEVICE — BLADE SAGITTAL #376 HEAVY WIDE

## (undated) DEVICE — SYRINGE TOOMEY 70ML SLIPTIP STERILE

## (undated) DEVICE — DRAPE-U-1015

## (undated) DEVICE — DRESSING TEGADERM 4X4 3/4

## (undated) DEVICE — COVER TABLE 60X90 ST 22/CS

## (undated) DEVICE — MIX-EVAC HIGH VACUUM KIT

## (undated) DEVICE — TIP SUCTION YANKAUER

## (undated) DEVICE — DRAPE 3/4 REINFORCED 53X77 20/CS

## (undated) DEVICE — SUTURETAPE 1.3MM SUTURE W NEEDLE

## (undated) DEVICE — MAT ABSORBANT SUPERMAT 50 X 56

## (undated) DEVICE — COVER LIGHTHANDLE STERILE BLUE

## (undated) DEVICE — HEMOSTAT SURGICEL ABSORBABLE 4 X 8

## (undated) DEVICE — SUTURE POLYSORB 2-0 UNDYED 1X30 GS-10

## (undated) DEVICE — GOWN SIRUS FABRNF RAGLAN XL ST 28/CS

## (undated) DEVICE — BLANKET UPPER BODY BAIR HUGGER

## (undated) DEVICE — GLOVE SZ 8 LINER PROTEXIS PI BL

## (undated) DEVICE — PAD GROUND ELECTROSURGICAL W/CORD

## (undated) DEVICE — TUBING SMOKE EVAC PENCIL COATED

## (undated) DEVICE — ADHESIVE SKIN DERMABOND ADVANCED 0.7ML

## (undated) DEVICE — SUTURE TICRON 5 BLUE 5X30 SCC

## (undated) DEVICE — DRAPE POUCH IRRIGATION

## (undated) DEVICE — APPLICATOR CHLORAPREP 26ML ORANGE TINT

## (undated) DEVICE — SOLN IRRIG STER WATER 1000ML

## (undated) DEVICE — BLADE SCALPEL #15

## (undated) DEVICE — DRAPE IOBAN

## (undated) DEVICE — VEST STERILE

## (undated) DEVICE — HEEL  ELBOW PROTECTOR

## (undated) DEVICE — FACEMASK FOR SHOULDER POSITONER

## (undated) DEVICE — SET HANDPIECE INTERPULSE